# Patient Record
Sex: MALE | HISPANIC OR LATINO | URBAN - METROPOLITAN AREA
[De-identification: names, ages, dates, MRNs, and addresses within clinical notes are randomized per-mention and may not be internally consistent; named-entity substitution may affect disease eponyms.]

---

## 2022-11-07 NOTE — TELEPHONE ENCOUNTER
Pt has up coming apt with dr Dixon  12/15/223 and is out of his diabetes medication is asking if at little amount at least can be send to pharmacy until he make it to the apt

## 2023-02-13 ENCOUNTER — OFFICE VISIT (OUTPATIENT)
Dept: FAMILY MEDICINE CLINIC | Facility: CLINIC | Age: 53
End: 2023-02-13
Payer: COMMERCIAL

## 2023-02-13 VITALS
SYSTOLIC BLOOD PRESSURE: 116 MMHG | WEIGHT: 229.2 LBS | TEMPERATURE: 97.3 F | BODY MASS INDEX: 36.83 KG/M2 | OXYGEN SATURATION: 95 % | DIASTOLIC BLOOD PRESSURE: 84 MMHG | RESPIRATION RATE: 16 BRPM | HEIGHT: 66 IN | HEART RATE: 88 BPM

## 2023-02-13 DIAGNOSIS — R68.89 COLD INTOLERANCE: ICD-10-CM

## 2023-02-13 DIAGNOSIS — E11.649 UNCONTROLLED TYPE 2 DIABETES MELLITUS WITH HYPOGLYCEMIA WITHOUT COMA: Primary | ICD-10-CM

## 2023-02-13 DIAGNOSIS — Z13.220 NEED FOR LIPID SCREENING: ICD-10-CM

## 2023-02-13 DIAGNOSIS — E66.01 CLASS 2 SEVERE OBESITY DUE TO EXCESS CALORIES WITH SERIOUS COMORBIDITY AND BODY MASS INDEX (BMI) OF 37.0 TO 37.9 IN ADULT: ICD-10-CM

## 2023-02-13 LAB
EXPIRATION DATE: ABNORMAL
HBA1C MFR BLD: 8.5 %
Lab: ABNORMAL

## 2023-02-13 PROCEDURE — 99204 OFFICE O/P NEW MOD 45 MIN: CPT | Performed by: FAMILY MEDICINE

## 2023-02-13 PROCEDURE — 36416 COLLJ CAPILLARY BLOOD SPEC: CPT | Performed by: FAMILY MEDICINE

## 2023-02-13 PROCEDURE — 83036 HEMOGLOBIN GLYCOSYLATED A1C: CPT | Performed by: FAMILY MEDICINE

## 2023-02-13 RX ORDER — GLIMEPIRIDE 2 MG/1
2 TABLET ORAL
Qty: 30 TABLET | Refills: 3 | Status: SHIPPED | OUTPATIENT
Start: 2023-02-13

## 2023-02-13 RX ORDER — LANCETS 33 GAUGE
EACH MISCELLANEOUS
Qty: 100 EACH | Refills: 12 | Status: SHIPPED | OUTPATIENT
Start: 2023-02-13

## 2023-02-13 RX ORDER — BLOOD-GLUCOSE METER
KIT MISCELLANEOUS
Qty: 1 EACH | Refills: 0 | Status: SHIPPED | OUTPATIENT
Start: 2023-02-13

## 2023-02-13 NOTE — PROGRESS NOTES
Subjective     Luis Jerez is a 52 y.o. male.     Chief Complaint   Patient presents with   • Establish Care   • Diabetes       History of Present Illness     To establish care, discuss the followings ;    Due to language barrier, an  was present during the history-taking and subsequent discussion (and for part of the physical exam) with this patient.      Type 2 DM;   Dx 2 years ago, on  mg /day   There are no hypoglycemic associated symptoms or complications.  Pt is compliant with treatment.  Pt monitors blood glucose at home. FBG range is generally > 150   Not on ARB  Eye exam is not current .   Eating non healthy diet   Not on ASA and statin   A1c today is 8.5     Obesity; as above he dose not eat HD or doing exercise       The following portions of the patient's history were reviewed and updated as appropriate: allergies, current medications, past family history, past medical history, past social history, past surgical history and problem list.        Review of Systems   Constitutional: Positive for fatigue.   Endocrine: Positive for cold intolerance, polydipsia and polyuria.       Vitals:    02/13/23 1022   BP: 116/84   Pulse: 88   Resp: 16   Temp: 97.3 °F (36.3 °C)   SpO2: 95%           02/13/23  1022   Weight: 104 kg (229 lb 3.2 oz)         Body mass index is 37.28 kg/m².      Current Outpatient Medications   Medication Sig Dispense Refill   • glimepiride (Amaryl) 2 MG tablet Take 1 tablet by mouth Every Morning Before Breakfast. 30 tablet 3   • glucose blood test strip Check BG 3 x /day 100 each 12   • glucose monitor monitoring kit Check BG daily 1 each 0   • metFORMIN (Glucophage) 850 MG tablet Take 1 tablet by mouth 2 (Two) Times a Day With Meals. 60 tablet 4   • OneTouch Delica Lancets 33G misc Check BG 3 x /day 100 each 12   • SITagliptin (Januvia) 50 MG tablet Take 1 tablet by mouth Daily. 30 tablet 3     No current facility-administered medications for this visit.                 Objective   Physical Exam  Vitals and nursing note reviewed.   Constitutional:       General: He is not in acute distress.     Appearance: He is obese. He is not ill-appearing, toxic-appearing or diaphoretic.   HENT:      Mouth/Throat:      Mouth: Mucous membranes are moist.   Neck:      Comments: No enlarged thyroid    Cardiovascular:      Rate and Rhythm: Normal rate and regular rhythm.      Heart sounds: Normal heart sounds. No murmur heard.  Pulmonary:      Effort: Pulmonary effort is normal. No respiratory distress.      Breath sounds: Normal breath sounds. No stridor. No wheezing, rhonchi or rales.   Musculoskeletal:      Cervical back: Neck supple.   Skin:     General: Skin is warm.   Neurological:      Mental Status: He is alert and oriented to person, place, and time.   Psychiatric:         Mood and Affect: Mood normal.         Behavior: Behavior normal.           Assessment & Plan   Diagnoses and all orders for this visit:    1. Uncontrolled type 2 diabetes mellitus with hypoglycemia without coma (HCC) (Primary)  -     POC Glycosylated Hemoglobin (Hb A1C)--> not at goal, will start on;   -     metFORMIN (Glucophage) 850 MG tablet; Take 1 tablet by mouth 2 (Two) Times a Day With Meals.  Dispense: 60 tablet; Refill: 4  -     glimepiride (Amaryl) 2 MG tablet; Take 1 tablet by mouth Every Morning Before Breakfast.  Dispense: 30 tablet; Refill: 3  -     SITagliptin (Januvia) 50 MG tablet; Take 1 tablet by mouth Daily.  Dispense: 30 tablet; Refill: 3  -     Ambulatory Referral to Diabetic Education  -     glucose monitor monitoring kit; Check BG daily  Dispense: 1 each; Refill: 0  -     glucose blood test strip; Check BG 3 x /day  Dispense: 100 each; Refill: 12  -     OneTouch Delica Lancets 33G misc; Check BG 3 x /day  Dispense: 100 each; Refill: 12  -     Comprehensive Metabolic Panel; Future  Discussed in length  diet and lifestyle modifications as prescribed. Encouraged patient to maintain a diabetic  diet, Increase lean protein and vegetable intake. Avoid sugary drinks and processed carbs including crackers, cookies, cakes. Recommend at least 30 minutes of exercise daily, at least 5 days per week. Increase exercise gradually. Blood glucose goal <150 fasting, <180 2 hr postprandial. Diabetic complications discussed. Annual eye examinations at Ophthalmology discussed, dental hygiene discussed  and foot care reviewed., home glucose monitoring emphasized, all medications, side effects and compliance discussed carefully and Hypoglycemia management and prevention reviewed. Reviewed ‘ABCs’ of diabetes management (respective goals in parentheses):  A1C (<7), blood pressure (<130/80), and cholesterol (LDL <100, if CVD <70).        2. Class 2 severe obesity due to excess calories with serious comorbidity and body mass index (BMI) of 37.0 to 37.9 in adult (HCC);  Patient's (Body mass index is 37.28 kg/m².) indicates that they are obese (BMI >30) with health related conditions that include diabetes mellitus . Weight is newly identified. BMI is is above average; BMI management plan is completed. We discussed portion control and increasing exercise.       3. Need for lipid screening  -     Lipid Panel; Future    4. Cold intolerance  -     TSH Rfx On Abnormal To Free T4; Future    Patient was given instructions and counseling regarding her condition or for health maintenance advice.   Please see specific information pulled into the AVS if appropriate.   Medical assistant and I wore mask and eyewear protection during entire encounter.    Patient wore mask.         I have fully discussed the nature of the medical condition(s) risks, complications, management, safe and proper use of medications.   She stated no allergy to the above prescribed medication.  I have discussed the SIDE EFFECT OF MEDICATION and importance TO report any side effect , the patient expressed good understanding.  Encouraged medication compliance and the  importance of keeping scheduled follow up appointments with me and any other providers.    Patient instructed to follow up with our office for results on any labs/imaging ordered during this visit.    Home care discussed  All questions answered  Patient verbalizes understanding and agrees to treatment plan.     Follow up: Return in about 4 weeks (around 3/13/2023) for F/U ON DM, fasting labs before apointment.

## 2023-02-27 DIAGNOSIS — E11.649 UNCONTROLLED TYPE 2 DIABETES MELLITUS WITH HYPOGLYCEMIA WITHOUT COMA: ICD-10-CM

## 2023-02-27 DIAGNOSIS — Z13.220 NEED FOR LIPID SCREENING: ICD-10-CM

## 2023-02-27 DIAGNOSIS — R68.89 COLD INTOLERANCE: ICD-10-CM

## 2023-03-07 LAB
ALBUMIN SERPL-MCNC: 4.9 G/DL (ref 3.5–5.2)
ALBUMIN/GLOB SERPL: 1.7 G/DL
ALP SERPL-CCNC: 68 U/L (ref 39–117)
ALT SERPL-CCNC: 25 U/L (ref 1–41)
AST SERPL-CCNC: 21 U/L (ref 1–40)
BILIRUB SERPL-MCNC: 0.4 MG/DL (ref 0–1.2)
BUN SERPL-MCNC: 11 MG/DL (ref 6–20)
BUN/CREAT SERPL: 10.5 (ref 7–25)
CALCIUM SERPL-MCNC: 9.8 MG/DL (ref 8.6–10.5)
CHLORIDE SERPL-SCNC: 100 MMOL/L (ref 98–107)
CHOLEST SERPL-MCNC: 203 MG/DL (ref 0–200)
CO2 SERPL-SCNC: 28.5 MMOL/L (ref 22–29)
CREAT SERPL-MCNC: 1.05 MG/DL (ref 0.76–1.27)
EGFRCR SERPLBLD CKD-EPI 2021: 85.4 ML/MIN/1.73
GLOBULIN SER CALC-MCNC: 2.9 GM/DL
GLUCOSE SERPL-MCNC: 83 MG/DL (ref 65–99)
HDLC SERPL-MCNC: 37 MG/DL (ref 40–60)
LDLC SERPL CALC-MCNC: 117 MG/DL (ref 0–100)
POTASSIUM SERPL-SCNC: 4.2 MMOL/L (ref 3.5–5.2)
PROT SERPL-MCNC: 7.8 G/DL (ref 6–8.5)
SODIUM SERPL-SCNC: 140 MMOL/L (ref 136–145)
TRIGL SERPL-MCNC: 283 MG/DL (ref 0–150)
TSH SERPL DL<=0.005 MIU/L-ACNC: 2.09 UIU/ML (ref 0.27–4.2)
VLDLC SERPL CALC-MCNC: 49 MG/DL (ref 5–40)

## 2023-03-13 ENCOUNTER — OFFICE VISIT (OUTPATIENT)
Dept: FAMILY MEDICINE CLINIC | Facility: CLINIC | Age: 53
End: 2023-03-13
Payer: COMMERCIAL

## 2023-03-13 VITALS
HEIGHT: 66 IN | RESPIRATION RATE: 16 BRPM | SYSTOLIC BLOOD PRESSURE: 118 MMHG | HEART RATE: 88 BPM | DIASTOLIC BLOOD PRESSURE: 96 MMHG | BODY MASS INDEX: 36.8 KG/M2 | WEIGHT: 229 LBS | OXYGEN SATURATION: 96 % | TEMPERATURE: 97.1 F

## 2023-03-13 DIAGNOSIS — E11.649 UNCONTROLLED TYPE 2 DIABETES MELLITUS WITH HYPOGLYCEMIA WITHOUT COMA: Primary | ICD-10-CM

## 2023-03-13 DIAGNOSIS — E66.01 CLASS 2 SEVERE OBESITY DUE TO EXCESS CALORIES WITH SERIOUS COMORBIDITY AND BODY MASS INDEX (BMI) OF 37.0 TO 37.9 IN ADULT: ICD-10-CM

## 2023-03-13 DIAGNOSIS — E78.5 DYSLIPIDEMIA: ICD-10-CM

## 2023-03-13 PROCEDURE — 99214 OFFICE O/P EST MOD 30 MIN: CPT | Performed by: FAMILY MEDICINE

## 2023-03-13 RX ORDER — BLOOD-GLUCOSE METER
KIT MISCELLANEOUS
COMMUNITY
Start: 2023-02-13

## 2023-03-13 NOTE — PROGRESS NOTES
Subjective     Luis Jerez is a 52 y.o. male.     Chief Complaint   Patient presents with   • Diabetes     4 week follow up, discuss labs.   • discuss labs results        History of Present Illness     Due to language barrier, an  via the phone was present during the history-taking and subsequent discussion (and for part of the physical exam) with this patient.      F/u on type 2 DM;   Since last OV , he starts eating more HD   Home FBS , first week was above goal 150-180's, next week numbers much improved with medication and healthy diet ranges 120-130's  There are no hypoglycemic associated symptoms or complications.  Pt is compliant with treatment.  Last A1c was 8.5     Lab Results   Component Value Date    HGBA1C 8.5 02/13/2023       He had labs done few days ago;  Results has been reviewed and discussed with pt  Showed normal TSH and CMP, his LDL, TC and TG are above goal    Obesity; Eats more HD than before , not doing exercise . No change in wt    Lab Results   Component Value Date    TSH 2.090 03/06/2023         Lab Results   Component Value Date    GLUCOSE 83 03/06/2023    BUN 11 03/06/2023    CREATININE 1.05 03/06/2023    EGFRRESULT 85.4 03/06/2023    BCR 10.5 03/06/2023    K 4.2 03/06/2023    CO2 28.5 03/06/2023    CALCIUM 9.8 03/06/2023    PROTENTOTREF 7.8 03/06/2023    ALBUMIN 4.9 03/06/2023    BILITOT 0.4 03/06/2023    AST 21 03/06/2023    ALT 25 03/06/2023     Lab Results   Component Value Date    CHLPL 203 (H) 03/06/2023    TRIG 283 (H) 03/06/2023    HDL 37 (L) 03/06/2023     (H) 03/06/2023             The following portions of the patient's history were reviewed and updated as appropriate: allergies, current medications, past family history, past medical history, past social history, past surgical history and problem list.        Review of Systems   Cardiovascular: Negative for chest pain, palpitations and leg swelling.       Vitals:    03/13/23 0949   BP: 118/96   Pulse:  88   Resp: 16   Temp: 97.1 °F (36.2 °C)   SpO2: 96%           03/13/23  0949   Weight: 104 kg (229 lb)         Body mass index is 37.25 kg/m².      Current Outpatient Medications   Medication Sig Dispense Refill   • glimepiride (Amaryl) 2 MG tablet Take 1 tablet by mouth Every Morning Before Breakfast. 30 tablet 3   • glucose blood test strip Check BG 3 x /day 100 each 12   • glucose monitor monitoring kit Check BG daily 1 each 0   • metFORMIN (Glucophage) 850 MG tablet Take 1 tablet by mouth 2 (Two) Times a Day With Meals. 60 tablet 4   • OneTouch Delica Lancets 33G misc Check BG 3 x /day 100 each 12   • SITagliptin (Januvia) 50 MG tablet Take 1 tablet by mouth Daily. 30 tablet 3   • Blood Glucose Monitoring Suppl (FreeStyle Lite) w/Device kit USE AS DIRECTED TO CHECK BLOOD GLUCOSE DAILY       No current facility-administered medications for this visit.                Objective   Physical Exam  Vitals and nursing note reviewed.   Constitutional:       General: He is not in acute distress.     Appearance: He is obese. He is not ill-appearing, toxic-appearing or diaphoretic.   HENT:      Mouth/Throat:      Mouth: Mucous membranes are moist.   Neck:      Comments: No enlarged thyroid    Cardiovascular:      Rate and Rhythm: Normal rate and regular rhythm.      Heart sounds: Normal heart sounds. No murmur heard.  Pulmonary:      Effort: Pulmonary effort is normal. No respiratory distress.      Breath sounds: Normal breath sounds. No stridor. No wheezing or rhonchi.   Musculoskeletal:      Cervical back: Neck supple.   Neurological:      Mental Status: He is alert and oriented to person, place, and time.   Psychiatric:         Mood and Affect: Mood normal.         Behavior: Behavior normal.         Thought Content: Thought content normal.           Assessment & Plan   Diagnoses and all orders for this visit:    1. Uncontrolled type 2 diabetes mellitus with hypoglycemia without coma (HCC) (Primary);  - FBS improving,  continue current medication + HD low in carbs and sugar   - Continue FBG monitoring , LOGIN form provided     2. Class 2 severe obesity due to excess calories with serious comorbidity and body mass index (BMI) of 37.0 to 37.9 in adult (HCC);  - No change in wt since last OV , advised to go for daily exercise   - Close wt monitoring /weekly   - Discussed in length about lifestyle modification , wt loss, eating healthy , daily exercise     3. Dyslipidemia;  - New problem  Encouraged patient to maintain a low cholesterol/DASH diet, increase aerobic exercise as tolerated, decrease alcohol, stop smoking if applicable, increase fiber intake, limit sodium intake to no more than 1,500mg/day, increase omega-3 fatty acids, and maintain medication compliance.       Patient was given instructions and counseling regarding her condition or for health maintenance advice.   Please see specific information pulled into the AVS if appropriate.   Medical assistant and I wore mask and eyewear protection during entire encounter.    Patient wore mask.         I have fully discussed the nature of the medical condition(s) risks, complications, management, safe and proper use of medications.   She stated no allergy to the above prescribed medication.  I have discussed the SIDE EFFECT OF MEDICATION and importance TO report any side effect , the patient expressed good understanding.  Encouraged medication compliance and the importance of keeping scheduled follow up appointments with me and any other providers.    Patient instructed to follow up with our office for results on any labs/imaging ordered during this visit.    Home care discussed  All questions answered  Patient verbalizes understanding and agrees to treatment plan.     Follow up: Return in about 4 weeks (around 4/10/2023) for follow up on current illness.

## 2023-04-10 ENCOUNTER — OFFICE VISIT (OUTPATIENT)
Dept: FAMILY MEDICINE CLINIC | Facility: CLINIC | Age: 53
End: 2023-04-10
Payer: COMMERCIAL

## 2023-04-10 VITALS
DIASTOLIC BLOOD PRESSURE: 78 MMHG | BODY MASS INDEX: 37.51 KG/M2 | HEIGHT: 66 IN | TEMPERATURE: 97.7 F | SYSTOLIC BLOOD PRESSURE: 102 MMHG | HEART RATE: 84 BPM | WEIGHT: 233.4 LBS | RESPIRATION RATE: 16 BRPM | OXYGEN SATURATION: 96 %

## 2023-04-10 DIAGNOSIS — E66.01 CLASS 2 SEVERE OBESITY DUE TO EXCESS CALORIES WITH SERIOUS COMORBIDITY AND BODY MASS INDEX (BMI) OF 38.0 TO 38.9 IN ADULT: ICD-10-CM

## 2023-04-10 DIAGNOSIS — E11.649 UNCONTROLLED TYPE 2 DIABETES MELLITUS WITH HYPOGLYCEMIA WITHOUT COMA: Primary | ICD-10-CM

## 2023-04-10 DIAGNOSIS — E78.5 DYSLIPIDEMIA: ICD-10-CM

## 2023-04-10 NOTE — PROGRESS NOTES
Subjective     Luis Jerez is a 52 y.o. male.     Chief Complaint   Patient presents with   • Diabetes     4 week follow up. Discuss Januvia medication is 130.00 co pay are there other medication option's.       History of Present Illness     Due to language barrier, an  via the phone was present during the history-taking and subsequent discussion (and for part of the physical exam) with this patient.        F/u on DM type 2;   Home FBS IMPROVING RANGE 100-130'S  There are no hypoglycemic associated symptoms or complications.  He is on MTF and Amaryl , not taking Januvia due to cost. Pt is compliant with treatment.  EATS more HD   Last A1c was 8.5     Lab Results   Component Value Date    HGBA1C 8.5 02/13/2023        Hyperlipidemia  This is a chronic problem. Last lipid tests were reviewed showed elevated TC,TG and LDL.  Eating more healthy , not doing daily exercise   Current antihyperlipidemic treatment includes HD.    Lab Results   Component Value Date    CHLPL 203 (H) 03/06/2023    TRIG 283 (H) 03/06/2023    HDL 37 (L) 03/06/2023     (H) 03/06/2023     Obesity; he eats more HD , not doing exercise , he will go to [facility] soon          The following portions of the patient's history were reviewed and updated as appropriate: allergies, current medications, past family history, past medical history, past social history, past surgical history and problem list.        Review of Systems   Constitutional: Negative for fatigue.   Endocrine: Negative for polydipsia and polyuria.       Vitals:    04/10/23 1455   BP: 102/78   Pulse: 84   Resp: 16   Temp: 97.7 °F (36.5 °C)   SpO2: 96%           04/10/23  1455   Weight: 106 kg (233 lb 6.4 oz)         Body mass index is 37.96 kg/m².      Current Outpatient Medications   Medication Sig Dispense Refill   • Blood Glucose Monitoring Suppl (FreeStyle Lite) w/Device kit USE AS DIRECTED TO CHECK BLOOD GLUCOSE DAILY     • glimepiride (Amaryl) 2 MG tablet Take  1 tablet by mouth Every Morning Before Breakfast. 30 tablet 3   • glucose blood test strip Check BG 3 x /day 100 each 12   • glucose monitor monitoring kit Check BG daily 1 each 0   • metFORMIN (Glucophage) 850 MG tablet Take 1 tablet by mouth 2 (Two) Times a Day With Meals. 60 tablet 4   • OneTouch Delica Lancets 33G misc Check BG 3 x /day 100 each 12     No current facility-administered medications for this visit.                Objective   Physical Exam  Vitals and nursing note reviewed.   Constitutional:       General: He is not in acute distress.     Appearance: He is obese. He is not ill-appearing, toxic-appearing or diaphoretic.   Cardiovascular:      Rate and Rhythm: Normal rate and regular rhythm.      Heart sounds: Normal heart sounds.   Pulmonary:      Effort: Pulmonary effort is normal. No respiratory distress.      Breath sounds: Normal breath sounds. No stridor. No wheezing or rhonchi.   Neurological:      Mental Status: He is alert and oriented to person, place, and time.   Psychiatric:         Mood and Affect: Mood normal.         Behavior: Behavior normal.         Thought Content: Thought content normal.           Assessment & Plan   Diagnoses and all orders for this visit:    1. Uncontrolled type 2 diabetes mellitus with hypoglycemia without coma (Primary);  - His FBS improving, doing well, continue on current Rx  - Continue lifestyle modification , wt loss, eating healthy , daily exercise     2. Dyslipidemia;  - Doing well on diet.   Continue to maintain a low cholesterol/DASH diet, increase aerobic exercise as tolerated, decrease alcohol, stop smoking if applicable, increase fiber intake, limit sodium intake to no more than 1,500mg/day, increase omega-3 fatty acids, and maintain medication compliance.     3. Class 2 severe obesity due to excess calories with serious comorbidity and body mass index (BMI) of 38.0 to 38.9 in adult  - Continue lifestyle modification , wt loss, eating healthy , daily  exercise           Patient was given instructions and counseling regarding her condition or for health maintenance advice.   Please see specific information pulled into the AVS if appropriate.        I have fully discussed the nature of the medical condition(s) risks, complications, management, safe and proper use of medications.   Pt stated no allergy to the above prescribed medication.  I have discussed the SIDE EFFECT OF MEDICATION and importance TO report any side effect , the patient expressed good understanding.  Encouraged medication compliance and the importance of keeping scheduled follow up appointments with me and any other providers.    Patient instructed to follow up with our office for results on any labs/imaging ordered during this visit.    Home care discussed  All questions answered  Patient verbalizes understanding and agrees to treatment plan.     Follow up: Return in about 5 weeks (around 5/16/2023) for follow up on current illness.

## 2023-08-03 ENCOUNTER — TELEPHONE (OUTPATIENT)
Dept: FAMILY MEDICINE CLINIC | Facility: CLINIC | Age: 53
End: 2023-08-03
Payer: COMMERCIAL

## 2023-11-03 ENCOUNTER — OFFICE VISIT (OUTPATIENT)
Dept: FAMILY MEDICINE CLINIC | Facility: CLINIC | Age: 53
End: 2023-11-03
Payer: COMMERCIAL

## 2023-11-03 VITALS
DIASTOLIC BLOOD PRESSURE: 80 MMHG | TEMPERATURE: 97.1 F | HEIGHT: 66 IN | RESPIRATION RATE: 16 BRPM | SYSTOLIC BLOOD PRESSURE: 104 MMHG | WEIGHT: 233.4 LBS | BODY MASS INDEX: 37.51 KG/M2 | OXYGEN SATURATION: 97 % | HEART RATE: 104 BPM

## 2023-11-03 DIAGNOSIS — R11.2 NAUSEA AND VOMITING, UNSPECIFIED VOMITING TYPE: ICD-10-CM

## 2023-11-03 DIAGNOSIS — M79.18 MUSCULOSKELETAL PAIN: ICD-10-CM

## 2023-11-03 DIAGNOSIS — K52.9 CHRONIC DIARRHEA: ICD-10-CM

## 2023-11-03 DIAGNOSIS — E11.9 DM TYPE 2, GOAL HBA1C < 7%: Primary | ICD-10-CM

## 2023-11-03 DIAGNOSIS — K21.9 CHRONIC GERD: ICD-10-CM

## 2023-11-03 LAB
EXPIRATION DATE: ABNORMAL
HBA1C MFR BLD: 7.5 % (ref 4.5–5.7)
Lab: ABNORMAL

## 2023-11-03 RX ORDER — OMEPRAZOLE 40 MG/1
40 CAPSULE, DELAYED RELEASE ORAL
Qty: 30 CAPSULE | Refills: 0 | Status: SHIPPED | OUTPATIENT
Start: 2023-11-03

## 2023-11-03 RX ORDER — ONDANSETRON 4 MG/1
4 TABLET, FILM COATED ORAL EVERY 8 HOURS PRN
Qty: 20 TABLET | Refills: 0 | Status: SHIPPED | OUTPATIENT
Start: 2023-11-03

## 2023-11-03 RX ORDER — LANCETS 33 GAUGE
EACH MISCELLANEOUS
Qty: 100 EACH | Refills: 12 | Status: SHIPPED | OUTPATIENT
Start: 2023-11-03

## 2023-11-03 NOTE — PROGRESS NOTES
Subjective     Luis Jerez is a 53 y.o. male.     Chief Complaint   Patient presents with    Vomiting     All night in bed stomach feels full, vomited 5 times throughout the night.    Headache    Pain     Upper part of back. Lower part of stomach feels weird for a few days.  Body aches feels like he has a fever. No soa    Diabetes       History of Present Illness     Due to language barrier, an  was present during the history-taking and subsequent discussion (and for part of the physical exam) with this patient.     C/o N,V since last night with stomach pain , HA , body ache  Has lose BM for many years with no blood.   No C     C/o hard skin on upper back with burning sensation for many ys     F/u on DM type 2;   No Home FBS as he run out of strips and lancets.    There are no hypoglycemic associated symptoms or complications.  He is on MTF and Amaryl.  He is compliant with treatment.  EATS HD   Last A1c was 8.5 , today 7.5     Lab Results   Component Value Date    HGBA1C 7.5 (A) 11/03/2023       HLD; as above eats HD , not on statin   Not doing execercise     Lab Results   Component Value Date    CHLPL 203 (H) 03/06/2023    TRIG 283 (H) 03/06/2023    HDL 37 (L) 03/06/2023     (H) 03/06/2023         The following portions of the patient's history were reviewed and updated as appropriate: allergies, current medications, past family history, past medical history, past social history, past surgical history, and problem list.        Review of Systems   Gastrointestinal:  Positive for abdominal distention, diarrhea, nausea and GERD.       Vitals:    11/03/23 0904   BP: 104/80   Pulse: 104   Resp: 16   Temp: 97.1 °F (36.2 °C)   SpO2: 97%           11/03/23 0904   Weight: 106 kg (233 lb 6.4 oz)         Body mass index is 37.96 kg/m².      Current Outpatient Medications   Medication Sig Dispense Refill    Blood Glucose Monitoring Suppl (FreeStyle Lite) w/Device kit USE AS DIRECTED TO CHECK BLOOD  GLUCOSE DAILY      glimepiride (Amaryl) 2 MG tablet Take 1 tablet by mouth Every Morning Before Breakfast. 30 tablet 3    glucose blood test strip Check BG 3 x /day 100 each 12    glucose monitor monitoring kit Check BG daily 1 each 0    metFORMIN (Glucophage) 850 MG tablet Take 1 tablet by mouth 2 (Two) Times a Day With Meals. 60 tablet 4    OneTouch Delica Lancets 33G misc Check BG 3 x /day 100 each 12    omeprazole (priLOSEC) 40 MG capsule Take 1 capsule by mouth Every Morning Before Breakfast. 30 capsule 0    ondansetron (Zofran) 4 MG tablet Take 1 tablet by mouth Every 8 (Eight) Hours As Needed for Nausea or Vomiting. 20 tablet 0     No current facility-administered medications for this visit.              Need to put THIS SMART LINK TO  take the foot exam from  ;  .[facility]  Diabetic Foot Exam Performed     Diabetic foot exam:   Left: Filament test present   Pulses Dorsalis Pedis:  present   Reflexes 4+    Proprioception normal   Sharp/dull discrimination normal     Right: Filament test present   Pulses Dorsalis Pedis:  present   Reflexes 4+    Proprioception normal   Sharp/dull discrimination normal      Objective   Physical Exam  Vitals and nursing note reviewed.   Constitutional:       General: He is not in acute distress.     Appearance: He is not toxic-appearing.   HENT:      Mouth/Throat:      Mouth: Mucous membranes are moist.   Eyes:      General: No scleral icterus.  Cardiovascular:      Rate and Rhythm: Normal rate and regular rhythm.      Heart sounds: Normal heart sounds. No murmur heard.  Pulmonary:      Effort: Pulmonary effort is normal. No respiratory distress.      Breath sounds: Normal breath sounds. No stridor. No wheezing or rhonchi.   Abdominal:      General: Bowel sounds are normal. There is no distension.      Palpations: Abdomen is soft.      Tenderness: There is abdominal tenderness. There is no right CVA tenderness, left CVA tenderness, guarding or rebound.   Musculoskeletal:          General: Tenderness present.      Right foot: Normal range of motion. No deformity.      Left foot: Normal range of motion. No deformity.      Comments: Hard skin on upper back    Feet:      Right foot:      Protective Sensation: 5 sites tested.  5 sites sensed.      Skin integrity: Skin integrity normal.      Toenail Condition: Right toenails are normal.      Left foot:      Protective Sensation: 5 sites tested.  5 sites sensed.      Skin integrity: Skin integrity normal.      Toenail Condition: Left toenails are normal.   Skin:     General: Skin is warm.      Coloration: Skin is not jaundiced.   Neurological:      Mental Status: He is alert and oriented to person, place, and time.   Psychiatric:         Mood and Affect: Mood normal.         Behavior: Behavior normal.           Assessment & Plan   Diagnoses and all orders for this visit:    1. DM type 2, goal HbA1c < 7% (Primary)  Comments:  A1c improved   continue current Rx + HD  Orders:  -     POC Glycosylated Hemoglobin (Hb A1C)  -     glucose blood test strip; Check BG 3 x /day  Dispense: 100 each; Refill: 12  -     OneTouch Delica Lancets 33G misc; Check BG 3 x /day  Dispense: 100 each; Refill: 12  -     Microalbumin / Creatinine Urine Ratio - Urine, Clean Catch    2. Chronic diarrhea  Comments:  could be due to MTF ?  Orders:  -     Ambulatory Referral to Gastroenterology    3. Nausea and vomiting, unspecified vomiting type  -     ondansetron (Zofran) 4 MG tablet; Take 1 tablet by mouth Every 8 (Eight) Hours As Needed for Nausea or Vomiting.  Dispense: 20 tablet; Refill: 0    4. Chronic GERD  Comments:  NEW , WILL START ON ppi + HD  Orders:  -     omeprazole (priLOSEC) 40 MG capsule; Take 1 capsule by mouth Every Morning Before Breakfast.  Dispense: 30 capsule; Refill: 0    5. Musculoskeletal pain  -     CK          Patient was given instructions and counseling regarding her condition or for health maintenance advice.   Please see specific information pulled  into the AVS if appropriate.       I have fully discussed the nature of the medical condition(s) risks, complications, management, safe and proper use of medications.   Pt stated no allergy to the above prescribed medication.  I have discussed the SIDE EFFECT OF MEDICATION and importance TO report any side effect , the patient expressed good understanding.  Encouraged medication compliance and the importance of keeping scheduled follow up appointments with me and any other providers.    Patient instructed to follow up with our office for results on any labs/imaging ordered during this visit.    Home care discussed  All questions answered  Patient verbalizes understanding and agrees to treatment plan.     Follow up: Return in about 3 months (around 2/3/2024) for physical, come fasting.

## 2023-11-04 LAB
ALBUMIN/CREAT UR: 5 MG/G CREAT (ref 0–29)
CK SERPL-CCNC: 92 U/L (ref 41–331)
CREAT UR-MCNC: 286.9 MG/DL
MICROALBUMIN UR-MCNC: 13.8 UG/ML

## 2023-12-01 DIAGNOSIS — E11.649 UNCONTROLLED TYPE 2 DIABETES MELLITUS WITH HYPOGLYCEMIA WITHOUT COMA: ICD-10-CM

## 2023-12-01 RX ORDER — GLIMEPIRIDE 2 MG/1
2 TABLET ORAL
Qty: 30 TABLET | Refills: 0 | Status: SHIPPED | OUTPATIENT
Start: 2023-12-01

## 2024-02-01 DIAGNOSIS — E11.649 UNCONTROLLED TYPE 2 DIABETES MELLITUS WITH HYPOGLYCEMIA WITHOUT COMA: ICD-10-CM

## 2024-02-01 RX ORDER — GLIMEPIRIDE 2 MG/1
2 TABLET ORAL
Qty: 30 TABLET | Refills: 0 | Status: SHIPPED | OUTPATIENT
Start: 2024-02-01 | End: 2024-02-05 | Stop reason: SDUPTHER

## 2024-02-05 ENCOUNTER — OFFICE VISIT (OUTPATIENT)
Dept: FAMILY MEDICINE CLINIC | Facility: CLINIC | Age: 54
End: 2024-02-05
Payer: COMMERCIAL

## 2024-02-05 VITALS
SYSTOLIC BLOOD PRESSURE: 120 MMHG | TEMPERATURE: 97.3 F | RESPIRATION RATE: 16 BRPM | WEIGHT: 237.2 LBS | HEIGHT: 66 IN | DIASTOLIC BLOOD PRESSURE: 80 MMHG | OXYGEN SATURATION: 98 % | BODY MASS INDEX: 38.12 KG/M2 | HEART RATE: 85 BPM

## 2024-02-05 DIAGNOSIS — Z12.5 SCREENING PSA (PROSTATE SPECIFIC ANTIGEN): ICD-10-CM

## 2024-02-05 DIAGNOSIS — E66.01 CLASS 2 SEVERE OBESITY DUE TO EXCESS CALORIES WITH SERIOUS COMORBIDITY AND BODY MASS INDEX (BMI) OF 38.0 TO 38.9 IN ADULT: ICD-10-CM

## 2024-02-05 DIAGNOSIS — Z00.00 ENCOUNTER FOR ANNUAL PHYSICAL EXAM: Primary | ICD-10-CM

## 2024-02-05 DIAGNOSIS — E78.5 DYSLIPIDEMIA: ICD-10-CM

## 2024-02-05 DIAGNOSIS — E11.9 DM TYPE 2, GOAL HBA1C < 7%: ICD-10-CM

## 2024-02-05 DIAGNOSIS — Z11.59 NEED FOR HEPATITIS C SCREENING TEST: ICD-10-CM

## 2024-02-05 DIAGNOSIS — R97.20 ELEVATED PSA: ICD-10-CM

## 2024-02-05 DIAGNOSIS — Z12.11 SCREENING FOR COLON CANCER: ICD-10-CM

## 2024-02-05 LAB
EXPIRATION DATE: ABNORMAL
HBA1C MFR BLD: 7.4 % (ref 4.5–5.7)
Lab: ABNORMAL

## 2024-02-05 PROCEDURE — 99396 PREV VISIT EST AGE 40-64: CPT | Performed by: FAMILY MEDICINE

## 2024-02-05 PROCEDURE — 36416 COLLJ CAPILLARY BLOOD SPEC: CPT | Performed by: FAMILY MEDICINE

## 2024-02-05 PROCEDURE — 83036 HEMOGLOBIN GLYCOSYLATED A1C: CPT | Performed by: FAMILY MEDICINE

## 2024-02-05 RX ORDER — GLIMEPIRIDE 2 MG/1
2 TABLET ORAL
Qty: 30 TABLET | Refills: 0 | Status: SHIPPED | OUTPATIENT
Start: 2024-02-05

## 2024-02-05 RX ORDER — LANCETS 33 GAUGE
EACH MISCELLANEOUS
Qty: 100 EACH | Refills: 12 | Status: SHIPPED | OUTPATIENT
Start: 2024-02-05

## 2024-02-05 NOTE — PROGRESS NOTES
"  Patient Care Team:  Taina Dixon MD as PCP - General (Urgent Care)     Chief complaint: Patient is in today for a physical          Patient is a 53 y.o. male who presents for his yearly physical exam.     HPI     Due to language barrier, an  was present during the history-taking and subsequent discussion (and for part of the physical exam) with this patient.      F/u on DM type 2; eats HD . Home FBS range 100-130\"s.    No hypoglycemic associated symptoms or complications.  Doing well on MTF and Amaryl.  Last A1c was 8.5 , 7.5 , today is 7.4  Not Exercising routinely.   Immunizations: reviewed and discussed.     Lab Results   Component Value Date    HGBA1C 7.4 (A) 02/05/2024         Health maintenance/lifestyle:  Immunization History   Administered Date(s) Administered    COVID-19 (PFIZER) Purple Cap Monovalent 03/27/2021, 04/17/2021         HM;  Colorectal Screening:  due, ordered   PSA(Over age 50):  ordered          PHQ-2 Depression Screening  Little interest or pleasure in doing things? 0-->not at all   Feeling down, depressed, or hopeless? 0-->not at all   PHQ-2 Total Score 0         Social History     Tobacco Use   Smoking Status Never   Smokeless Tobacco Never     Social History     Substance and Sexual Activity   Alcohol Use Never         Review of Systems   Cardiovascular:  Negative for chest pain.         History  Past Medical History:   Diagnosis Date    Prediabetes       History reviewed. No pertinent surgical history.   No Known Allergies   History reviewed. No pertinent family history.  Social History     Socioeconomic History    Marital status:    Tobacco Use    Smoking status: Never    Smokeless tobacco: Never   Substance and Sexual Activity    Alcohol use: Never    Drug use: Never        Current Outpatient Medications:     Blood Glucose Monitoring Suppl (FreeStyle Lite) w/Device kit, USE AS DIRECTED TO CHECK BLOOD GLUCOSE DAILY, Disp: , Rfl:     glimepiride (AMARYL) 2 " "MG tablet, Take 1 tablet by mouth Every Morning Before Breakfast., Disp: 30 tablet, Rfl: 0    glucose blood test strip, Check BG 3 x /day, Disp: 100 each, Rfl: 12    glucose monitor monitoring kit, Check BG daily, Disp: 1 each, Rfl: 0    metFORMIN (GLUCOPHAGE) 850 MG tablet, Take 1 tablet by mouth 2 (Two) Times a Day With Meals., Disp: 60 tablet, Rfl: 0    OneTouch Delica Lancets 33G misc, Check BG 3 x /day, Disp: 100 each, Rfl: 12    ondansetron (Zofran) 4 MG tablet, Take 1 tablet by mouth Every 8 (Eight) Hours As Needed for Nausea or Vomiting., Disp: 20 tablet, Rfl: 0                  /80   Pulse 85   Temp 97.3 °F (36.3 °C)   Resp 16   Ht 167 cm (65.75\")   Wt 108 kg (237 lb 3.2 oz)   SpO2 98%   BMI 38.58 kg/m²       Physical Exam  Vitals and nursing note reviewed.   Constitutional:       General: He is not in acute distress.     Appearance: He is not ill-appearing, toxic-appearing or diaphoretic.   HENT:      Head: Normocephalic.      Right Ear: Tympanic membrane, ear canal and external ear normal.      Left Ear: Tympanic membrane, ear canal and external ear normal.      Nose: Nose normal. No congestion or rhinorrhea.      Mouth/Throat:      Mouth: Mucous membranes are moist.      Pharynx: Oropharynx is clear. No oropharyngeal exudate or posterior oropharyngeal erythema.   Eyes:      General: No scleral icterus.        Right eye: No discharge.         Left eye: No discharge.      Extraocular Movements: Extraocular movements intact.      Conjunctiva/sclera: Conjunctivae normal.      Pupils: Pupils are equal, round, and reactive to light.   Neck:      Comments: No enlarged thyroid      Cardiovascular:      Rate and Rhythm: Normal rate and regular rhythm.      Heart sounds: Normal heart sounds. No murmur heard.     No friction rub. No gallop.   Pulmonary:      Effort: Pulmonary effort is normal. No respiratory distress.      Breath sounds: Normal breath sounds. No stridor. No wheezing, rhonchi or rales. "   Abdominal:      General: Bowel sounds are normal. There is no distension.      Palpations: Abdomen is soft. There is no mass.      Tenderness: There is no abdominal tenderness. There is no right CVA tenderness, left CVA tenderness, guarding or rebound.      Hernia: No hernia is present.   Musculoskeletal:         General: Normal range of motion.      Cervical back: Normal range of motion and neck supple.      Right lower leg: No edema.      Left lower leg: No edema.   Lymphadenopathy:      Cervical: No cervical adenopathy.   Skin:     General: Skin is warm.      Coloration: Skin is not jaundiced or pale.      Findings: No erythema or rash.   Neurological:      General: No focal deficit present.      Mental Status: He is alert and oriented to person, place, and time.      Sensory: No sensory deficit.      Motor: No weakness.      Coordination: Coordination normal.      Gait: Gait normal.      Deep Tendon Reflexes: Reflexes normal.   Psychiatric:         Mood and Affect: Mood normal.         Behavior: Behavior normal.         Thought Content: Thought content normal.         Judgment: Judgment normal.                   Diagnoses and all orders for this visit:    1. Encounter for annual physical exam (Primary)  -     CBC (No Diff)    2. DM type 2, goal HbA1c < 7%  -     POC Glycosylated Hemoglobin (Hb A1C)  -     glimepiride (AMARYL) 2 MG tablet; Take 1 tablet by mouth Every Morning Before Breakfast.  Dispense: 30 tablet; Refill: 0  -     glucose blood test strip; Check BG 3 x /day  Dispense: 100 each; Refill: 12  -     metFORMIN (GLUCOPHAGE) 850 MG tablet; Take 1 tablet by mouth 2 (Two) Times a Day With Meals.  Dispense: 60 tablet; Refill: 0  -     OneTouch Delica Lancets 33G misc; Check BG 3 x /day  Dispense: 100 each; Refill: 12  -     Comprehensive Metabolic Panel    3. Need for hepatitis C screening test  -     Hepatitis C Antibody    4. Dyslipidemia  -     Lipid Panel    5. Class 2 severe obesity due to excess  calories with serious comorbidity and body mass index (BMI) of 38.0 to 38.9 in adult    6. Screening PSA (prostate specific antigen)  -     PSA Screen    7. Screening for colon cancer  -     Ambulatory Referral to Gastroenterology    8. Elevated PSA  -     Ambulatory Referral to Urology        -Age and sex appropriate physical exam performed and documented.   -Updated past medical, family, social and surgical histories as well as allergies and care team list.   -Addressed care gaps listed in the medical record.  -advised to eat healthy diet, do daily exercise   - discussed and updates preventive screening measures            Follow up: Return in about 4 months (around 6/5/2024) for follow up on current illness.  Plan of care discussed with pt. They verbalized understanding and agreement.     Taina Dixon MD   2/6/2024   10:18 EST           Addendum 2/6;  Labs showed elevated LDL and TC,advised to eat more HD , angie discuss statin next OV  Elevated PSA , Referral to urology done

## 2024-02-06 LAB
ALBUMIN SERPL-MCNC: 4.5 G/DL (ref 3.5–5.2)
ALBUMIN/GLOB SERPL: 1.6 G/DL
ALP SERPL-CCNC: 67 U/L (ref 39–117)
ALT SERPL-CCNC: 27 U/L (ref 1–41)
AST SERPL-CCNC: 20 U/L (ref 1–40)
BILIRUB SERPL-MCNC: 0.3 MG/DL (ref 0–1.2)
BUN SERPL-MCNC: 13 MG/DL (ref 6–20)
BUN/CREAT SERPL: 12.7 (ref 7–25)
CALCIUM SERPL-MCNC: 9.5 MG/DL (ref 8.6–10.5)
CHLORIDE SERPL-SCNC: 100 MMOL/L (ref 98–107)
CHOLEST SERPL-MCNC: 217 MG/DL (ref 0–200)
CO2 SERPL-SCNC: 26.3 MMOL/L (ref 22–29)
CREAT SERPL-MCNC: 1.02 MG/DL (ref 0.76–1.27)
EGFRCR SERPLBLD CKD-EPI 2021: 87.9 ML/MIN/1.73
ERYTHROCYTE [DISTWIDTH] IN BLOOD BY AUTOMATED COUNT: 13.5 % (ref 12.3–15.4)
GLOBULIN SER CALC-MCNC: 2.9 GM/DL
GLUCOSE SERPL-MCNC: 152 MG/DL (ref 65–99)
HCT VFR BLD AUTO: 45.2 % (ref 37.5–51)
HCV IGG SERPL QL IA: NON REACTIVE
HDLC SERPL-MCNC: 33 MG/DL (ref 40–60)
HGB BLD-MCNC: 14.7 G/DL (ref 13–17.7)
LDLC SERPL CALC-MCNC: 141 MG/DL (ref 0–100)
MCH RBC QN AUTO: 28.5 PG (ref 26.6–33)
MCHC RBC AUTO-ENTMCNC: 32.5 G/DL (ref 31.5–35.7)
MCV RBC AUTO: 87.6 FL (ref 79–97)
PLATELET # BLD AUTO: 233 10*3/MM3 (ref 140–450)
POTASSIUM SERPL-SCNC: 4.3 MMOL/L (ref 3.5–5.2)
PROT SERPL-MCNC: 7.4 G/DL (ref 6–8.5)
PSA SERPL-MCNC: 5.82 NG/ML (ref 0–4)
RBC # BLD AUTO: 5.16 10*6/MM3 (ref 4.14–5.8)
SODIUM SERPL-SCNC: 139 MMOL/L (ref 136–145)
TRIGL SERPL-MCNC: 234 MG/DL (ref 0–150)
VLDLC SERPL CALC-MCNC: 43 MG/DL (ref 5–40)
WBC # BLD AUTO: 8.4 10*3/MM3 (ref 3.4–10.8)